# Patient Record
Sex: MALE | Race: WHITE | Employment: UNEMPLOYED | URBAN - METROPOLITAN AREA
[De-identification: names, ages, dates, MRNs, and addresses within clinical notes are randomized per-mention and may not be internally consistent; named-entity substitution may affect disease eponyms.]

---

## 2017-01-20 RX ORDER — PANTOPRAZOLE SODIUM 40 MG/10ML
40 INJECTION, POWDER, LYOPHILIZED, FOR SOLUTION INTRAVENOUS 2 TIMES DAILY
Qty: 60 EACH | Refills: 1 | Status: SHIPPED | OUTPATIENT
Start: 2017-01-20 | End: 2020-04-06

## 2017-01-23 RX ORDER — PANTOPRAZOLE SODIUM 40 MG/1
40 TABLET, DELAYED RELEASE ORAL DAILY
Qty: 90 TABLET | Refills: 0 | OUTPATIENT
Start: 2017-01-23 | End: 2017-07-06 | Stop reason: SDUPTHER

## 2017-01-23 RX ORDER — PANTOPRAZOLE SODIUM 40 MG/1
TABLET, DELAYED RELEASE ORAL
Qty: 60 TABLET | Refills: 4 | Status: SHIPPED | OUTPATIENT
Start: 2017-01-23 | End: 2017-07-06

## 2017-07-06 RX ORDER — PANTOPRAZOLE SODIUM 40 MG/1
40 TABLET, DELAYED RELEASE ORAL DAILY
Qty: 90 TABLET | Refills: 0 | Status: SHIPPED | OUTPATIENT
Start: 2017-07-06 | End: 2017-09-21 | Stop reason: SDUPTHER

## 2017-07-10 RX ORDER — PANTOPRAZOLE SODIUM 40 MG/1
TABLET, DELAYED RELEASE ORAL
Qty: 60 TABLET | Refills: 3 | Status: SHIPPED | OUTPATIENT
Start: 2017-07-10

## 2017-09-21 ENCOUNTER — OFFICE VISIT (OUTPATIENT)
Dept: FAMILY MEDICINE CLINIC | Age: 52
End: 2017-09-21

## 2017-09-21 VITALS
HEART RATE: 62 BPM | DIASTOLIC BLOOD PRESSURE: 76 MMHG | WEIGHT: 147 LBS | HEIGHT: 70 IN | BODY MASS INDEX: 21.05 KG/M2 | SYSTOLIC BLOOD PRESSURE: 118 MMHG | RESPIRATION RATE: 16 BRPM | OXYGEN SATURATION: 97 % | TEMPERATURE: 97 F

## 2017-09-21 DIAGNOSIS — Z12.11 SCREEN FOR COLON CANCER: ICD-10-CM

## 2017-09-21 DIAGNOSIS — E21.3 HYPERPARATHYROIDISM (HCC): ICD-10-CM

## 2017-09-21 DIAGNOSIS — Z12.5 SCREENING PSA (PROSTATE SPECIFIC ANTIGEN): ICD-10-CM

## 2017-09-21 DIAGNOSIS — K21.9 GASTROESOPHAGEAL REFLUX DISEASE WITHOUT ESOPHAGITIS: ICD-10-CM

## 2017-09-21 DIAGNOSIS — Z00.00 WELL ADULT EXAM: Primary | ICD-10-CM

## 2017-09-21 DIAGNOSIS — R35.0 URINARY FREQUENCY: ICD-10-CM

## 2017-09-21 PROCEDURE — 90688 IIV4 VACCINE SPLT 0.5 ML IM: CPT | Performed by: FAMILY MEDICINE

## 2017-09-21 PROCEDURE — 90715 TDAP VACCINE 7 YRS/> IM: CPT | Performed by: FAMILY MEDICINE

## 2017-09-21 PROCEDURE — 99396 PREV VISIT EST AGE 40-64: CPT | Performed by: FAMILY MEDICINE

## 2017-09-21 PROCEDURE — 99214 OFFICE O/P EST MOD 30 MIN: CPT | Performed by: FAMILY MEDICINE

## 2017-09-21 PROCEDURE — 90472 IMMUNIZATION ADMIN EACH ADD: CPT | Performed by: FAMILY MEDICINE

## 2017-09-21 PROCEDURE — 90471 IMMUNIZATION ADMIN: CPT | Performed by: FAMILY MEDICINE

## 2017-09-21 RX ORDER — PANTOPRAZOLE SODIUM 40 MG/1
40 TABLET, DELAYED RELEASE ORAL 2 TIMES DAILY
Qty: 180 TABLET | Refills: 1 | Status: SHIPPED | OUTPATIENT
Start: 2017-09-21 | End: 2018-04-23 | Stop reason: SDUPTHER

## 2017-09-21 RX ORDER — TADALAFIL 5 MG/1
TABLET ORAL
Qty: 30 TABLET | Refills: 3 | Status: SHIPPED | OUTPATIENT
Start: 2017-09-21 | End: 2020-04-06

## 2017-09-21 ASSESSMENT — ENCOUNTER SYMPTOMS
NAUSEA: 0
ABDOMINAL PAIN: 0
TROUBLE SWALLOWING: 0
COUGH: 1
RHINORRHEA: 0
VOMITING: 0
CHEST TIGHTNESS: 0
EYE DISCHARGE: 0
EYE REDNESS: 0
EYE ITCHING: 0
SHORTNESS OF BREATH: 0
WHEEZING: 0

## 2017-09-25 LAB
A/G RATIO: 1.6 (ref 1.1–2.2)
ALBUMIN SERPL-MCNC: 4.5 G/DL (ref 3.4–5)
ALP BLD-CCNC: 64 U/L (ref 40–129)
ALT SERPL-CCNC: 7 U/L (ref 10–40)
ANION GAP SERPL CALCULATED.3IONS-SCNC: 16 MMOL/L (ref 3–16)
AST SERPL-CCNC: 11 U/L (ref 15–37)
BASOPHILS ABSOLUTE: 0.1 K/UL (ref 0–0.2)
BASOPHILS RELATIVE PERCENT: 1 %
BILIRUB SERPL-MCNC: 0.5 MG/DL (ref 0–1)
BUN BLDV-MCNC: 12 MG/DL (ref 7–20)
CALCIUM SERPL-MCNC: 9.8 MG/DL (ref 8.3–10.6)
CHLORIDE BLD-SCNC: 100 MMOL/L (ref 99–110)
CHOLESTEROL, TOTAL: 170 MG/DL (ref 0–199)
CO2: 25 MMOL/L (ref 21–32)
CREAT SERPL-MCNC: 0.9 MG/DL (ref 0.9–1.3)
EOSINOPHILS ABSOLUTE: 0.2 K/UL (ref 0–0.6)
EOSINOPHILS RELATIVE PERCENT: 1.4 %
GFR AFRICAN AMERICAN: >60
GFR NON-AFRICAN AMERICAN: >60
GLOBULIN: 2.9 G/DL
GLUCOSE BLD-MCNC: 79 MG/DL (ref 70–99)
HCT VFR BLD CALC: 47.5 % (ref 40.5–52.5)
HDLC SERPL-MCNC: 58 MG/DL (ref 40–60)
HEMOGLOBIN: 15.3 G/DL (ref 13.5–17.5)
LDL CHOLESTEROL CALCULATED: 97 MG/DL
LYMPHOCYTES ABSOLUTE: 2.1 K/UL (ref 1–5.1)
LYMPHOCYTES RELATIVE PERCENT: 18.2 %
MCH RBC QN AUTO: 26.5 PG (ref 26–34)
MCHC RBC AUTO-ENTMCNC: 32.2 G/DL (ref 31–36)
MCV RBC AUTO: 82.4 FL (ref 80–100)
MONOCYTES ABSOLUTE: 1 K/UL (ref 0–1.3)
MONOCYTES RELATIVE PERCENT: 8.2 %
NEUTROPHILS ABSOLUTE: 8.4 K/UL (ref 1.7–7.7)
NEUTROPHILS RELATIVE PERCENT: 71.2 %
PARATHYROID HORMONE INTACT: 35.5 PG/ML (ref 14–72)
PDW BLD-RTO: 15.9 % (ref 12.4–15.4)
PLATELET # BLD: 362 K/UL (ref 135–450)
PMV BLD AUTO: 9.6 FL (ref 5–10.5)
POTASSIUM SERPL-SCNC: 5 MMOL/L (ref 3.5–5.1)
RBC # BLD: 5.76 M/UL (ref 4.2–5.9)
SODIUM BLD-SCNC: 141 MMOL/L (ref 136–145)
TOTAL PROTEIN: 7.4 G/DL (ref 6.4–8.2)
TRIGL SERPL-MCNC: 74 MG/DL (ref 0–150)
TSH SERPL DL<=0.05 MIU/L-ACNC: 1.44 UIU/ML (ref 0.27–4.2)
VLDLC SERPL CALC-MCNC: 15 MG/DL
WBC # BLD: 11.8 K/UL (ref 4–11)

## 2017-09-26 LAB — HIV-1 AND HIV-2 ANTIBODIES: NORMAL

## 2017-10-14 RX ORDER — TAMSULOSIN HYDROCHLORIDE 0.4 MG/1
0.4 CAPSULE ORAL DAILY
Qty: 30 CAPSULE | Refills: 0 | Status: SHIPPED | OUTPATIENT
Start: 2017-10-14 | End: 2020-04-06

## 2017-10-14 NOTE — TELEPHONE ENCOUNTER
Cialis was denied try:  Flomax 0.4 mg   Take po qd  # 30    Qvar denied.   Call in [facility] 298 inhale qd  # 1 3 rf

## 2018-04-23 ENCOUNTER — TELEPHONE (OUTPATIENT)
Dept: FAMILY MEDICINE CLINIC | Age: 53
End: 2018-04-23

## 2018-04-23 DIAGNOSIS — R35.0 URINARY FREQUENCY: ICD-10-CM

## 2018-04-23 RX ORDER — PANTOPRAZOLE SODIUM 40 MG/1
40 TABLET, DELAYED RELEASE ORAL DAILY
Qty: 90 TABLET | Refills: 1 | Status: SHIPPED | OUTPATIENT
Start: 2018-04-23 | End: 2018-04-25 | Stop reason: SDUPTHER

## 2018-04-25 ENCOUNTER — TELEPHONE (OUTPATIENT)
Dept: FAMILY MEDICINE CLINIC | Age: 53
End: 2018-04-25

## 2018-04-25 DIAGNOSIS — R35.0 URINARY FREQUENCY: ICD-10-CM

## 2018-04-25 RX ORDER — PANTOPRAZOLE SODIUM 40 MG/1
40 TABLET, DELAYED RELEASE ORAL 2 TIMES DAILY
Qty: 180 TABLET | Refills: 1 | Status: SHIPPED | OUTPATIENT
Start: 2018-04-25 | End: 2020-04-06

## 2018-04-25 RX ORDER — PANTOPRAZOLE SODIUM 40 MG/1
40 TABLET, DELAYED RELEASE ORAL 2 TIMES DAILY
Qty: 180 TABLET | Refills: 1 | Status: SHIPPED | OUTPATIENT
Start: 2018-04-25 | End: 2018-04-25 | Stop reason: SDUPTHER

## 2020-04-06 ENCOUNTER — HOSPITAL ENCOUNTER (EMERGENCY)
Age: 55
Discharge: HOME | End: 2020-04-06

## 2020-04-06 VITALS
TEMPERATURE: 98.4 F | OXYGEN SATURATION: 96 % | BODY MASS INDEX: 22.9 KG/M2 | WEIGHT: 160 LBS | RESPIRATION RATE: 18 BRPM | HEIGHT: 70 IN | HEART RATE: 73 BPM | DIASTOLIC BLOOD PRESSURE: 88 MMHG | SYSTOLIC BLOOD PRESSURE: 153 MMHG

## 2020-04-06 PROCEDURE — 99282 EMERGENCY DEPT VISIT SF MDM: CPT

## 2020-04-06 PROCEDURE — 6370000000 HC RX 637 (ALT 250 FOR IP): Performed by: PHYSICIAN ASSISTANT

## 2020-04-06 RX ORDER — CEPHALEXIN 250 MG/1
500 CAPSULE ORAL ONCE
Status: COMPLETED | OUTPATIENT
Start: 2020-04-06 | End: 2020-04-06

## 2020-04-06 RX ORDER — NYSTATIN 100000 [USP'U]/G
POWDER TOPICAL
Qty: 1 BOTTLE | Refills: 1 | Status: SHIPPED | OUTPATIENT
Start: 2020-04-06

## 2020-04-06 RX ORDER — SULFAMETHOXAZOLE AND TRIMETHOPRIM 800; 160 MG/1; MG/1
1 TABLET ORAL 2 TIMES DAILY
Qty: 20 TABLET | Refills: 0 | Status: SHIPPED | OUTPATIENT
Start: 2020-04-06 | End: 2020-04-16

## 2020-04-06 RX ORDER — SULFAMETHOXAZOLE AND TRIMETHOPRIM 800; 160 MG/1; MG/1
1 TABLET ORAL ONCE
Status: COMPLETED | OUTPATIENT
Start: 2020-04-06 | End: 2020-04-06

## 2020-04-06 RX ORDER — CEPHALEXIN 500 MG/1
500 CAPSULE ORAL 4 TIMES DAILY
Qty: 40 CAPSULE | Refills: 0 | Status: SHIPPED | OUTPATIENT
Start: 2020-04-06 | End: 2020-04-16

## 2020-04-06 RX ADMIN — CEPHALEXIN 500 MG: 250 CAPSULE ORAL at 20:36

## 2020-04-06 RX ADMIN — SULFAMETHOXAZOLE AND TRIMETHOPRIM 1 TABLET: 800; 160 TABLET ORAL at 20:36

## 2020-04-06 ASSESSMENT — PAIN SCALES - GENERAL: PAINLEVEL_OUTOF10: 1

## 2020-04-07 NOTE — ED PROVIDER NOTES
DAY WITH MEALS 60 tablet 3     No Known Allergies    REVIEW OF SYSTEMS:  6 systems reviewed, pertinent positives per HPI otherwise noted to be negative. PHYSICAL EXAM:  BP (!) 153/88   Pulse 73   Temp 98.4 °F (36.9 °C) (Oral)   Resp 18   Ht 5' 10\" (1.778 m)   Wt 160 lb (72.6 kg)   SpO2 96%   BMI 22.96 kg/m²   CONSTITUTIONAL: Awake and alert. Well-developed. Well-nourished. Non-toxic. Cooperative. No acute distress. HENT: Normocephalic. Atraumatic. External ears normal, without discharge. Nose normal. Mucous membranes moist.  EYES: Conjunctiva non-injected. No scleral icterus. PERRL. EOM's grossly intact. NECK: Supple. Normal ROM. CARDIOVASCULAR: Normal heart rate. Intact distal pulses. PULMONARY/CHEST WALL: Breathing is unlabored. Equal, symmetric chest rise. Speaking comfortably in full sentences. ABDOMEN: Nondistended  MUSKULOSKELETAL: Normal ROM. No acute deformities. SKIN: Warm and dry. Bright erythematous and well demarcated outbreak to the skin at the left inguinal crease and wrapping around the left buttock, laterally. Erythema is not raised. There are no blisters or vesicular lesions. Erythema does rodney. There is also a superficial wound to the left buttock. Patient reports this is from picking at the skin. See pictures below. NEUROLOGICAL: Alert and oriented x 3. Strength is 5/5 in all extremities and sensation is intact. PSYCHIATRIC: Normal affect    Left groin:                  Labs:    None    RADIOLOGY:    None          ED COURSE/MDM:  Patient was given the following medications:  Medications   sulfamethoxazole-trimethoprim (BACTRIM DS;SEPTRA DS) 800-160 MG per tablet 1 tablet (1 tablet Oral Given 4/6/20 2036)   cephALEXin (KEFLEX) capsule 500 mg (500 mg Oral Given 4/6/20 2036)       I have evaluated this patient here in the ED. Patient is here with a mildly painful red rash to the left groin that started this morning and has worsened.   He is concerned about infection or essentially cellulitis and given the fact that this has worsened pretty rapidly and he does have a small open wound to the left buttock I am going to cover him with Bactrim and Keflex. The distribution and site of the rash is more typical for a fungal type infection and for that reason I will cover him with a nystatin powder and encouraged good hygiene and keeping the area dry. I told him to think about possible things he could be reacting to as well. Patient mindful of everything. He does have primary care follow-up. He knows to return if symptoms worsen. Patient was given scripts for the following medications. I counseled patient how to take these medications. Discharge Medication List as of 4/6/2020  9:04 PM      START taking these medications    Details   sulfamethoxazole-trimethoprim (BACTRIM DS) 800-160 MG per tablet Take 1 tablet by mouth 2 times daily for 10 days, Disp-20 tablet, R-0Print      cephALEXin (KEFLEX) 500 MG capsule Take 1 capsule by mouth 4 times daily for 10 days, Disp-40 capsule, R-0Print      nystatin (MYCOSTATIN) 263251 UNIT/GM powder Apply topically 4 times daily. , Disp-1 Bottle, R-1, Print           I estimate there is LOW risk for ABSCESS, COMPARTMENT SYNDROME, NECROTIZING FASCIITIS, TENDON OR NEUROVASCULAR INJURY, or RETAINED FOREIGN BODY, thus I consider the discharge disposition reasonable. Also, there is no evidence or peritonitis, sepsis, or toxicity. Rocío Herrera and I have discussed the diagnosis and risks, and we agree with discharging home to follow-up with their primary doctor. We also discussed returning to the Emergency Department immediately if new or worsening symptoms occur. We have discussed the symptoms which are most concerning (e.g., changing or worsening pain, fever, numbness, weakness, cool or painful digits) that necessitate immediate return    CLINICAL IMPRESSION:  1.  Cellulitis of groin        Blood pressure (!) 153/88, pulse 73, temperature 98.4 °F (36.9 °C), temperature source Oral, resp. rate 18, height 5' 10\" (1.778 m), weight 160 lb (72.6 kg), SpO2 96 %. PATIENT REFERRED TO:  [facility] [address] [facility] [id number] [phone]    Schedule an appointment as soon as possible for a visit in 3 days  For re-check    [facility] Emergency Department  [address]  Go to   If symptoms worsen        DISPOSITION  Patient was discharged to home in good condition.           Marylen Quitter, [place]  04/06/20 1662

## 2022-09-16 ENCOUNTER — HOSPITAL ENCOUNTER (EMERGENCY)
Age: 57
Discharge: LEFT BEFORE DISCHARGE | End: 2022-09-16

## 2022-09-16 VITALS
OXYGEN SATURATION: 98 % | HEIGHT: 70 IN | HEART RATE: 69 BPM | RESPIRATION RATE: 20 BRPM | BODY MASS INDEX: 22.19 KG/M2 | SYSTOLIC BLOOD PRESSURE: 137 MMHG | TEMPERATURE: 97.3 F | WEIGHT: 155 LBS | DIASTOLIC BLOOD PRESSURE: 77 MMHG

## 2022-09-16 ASSESSMENT — PAIN DESCRIPTION - PAIN TYPE: TYPE: ACUTE PAIN

## 2022-09-16 ASSESSMENT — PAIN DESCRIPTION - FREQUENCY: FREQUENCY: CONTINUOUS

## 2022-09-16 ASSESSMENT — PAIN - FUNCTIONAL ASSESSMENT: PAIN_FUNCTIONAL_ASSESSMENT: 0-10

## 2022-09-16 ASSESSMENT — PAIN DESCRIPTION - LOCATION: LOCATION: ABDOMEN

## 2022-09-16 ASSESSMENT — PAIN DESCRIPTION - DESCRIPTORS: DESCRIPTORS: SHARP

## 2022-09-16 ASSESSMENT — PAIN DESCRIPTION - ONSET: ONSET: GRADUAL
